# Patient Record
(demographics unavailable — no encounter records)

---

## 2024-12-09 NOTE — HEALTH RISK ASSESSMENT
[Yes] : Yes [2 - 3 times a week (3 pts)] : 2 - 3  times a week (3 points) [1 or 2 (0 pts)] : 1 or 2 (0 points) [Never (0 pts)] : Never (0 points) [Employed] : employed [Never] : Never [0-4] : 0-4